# Patient Record
Sex: MALE | Race: OTHER | URBAN - METROPOLITAN AREA
[De-identification: names, ages, dates, MRNs, and addresses within clinical notes are randomized per-mention and may not be internally consistent; named-entity substitution may affect disease eponyms.]

---

## 2022-02-21 ENCOUNTER — HOSPITAL ENCOUNTER (OUTPATIENT)
Dept: LAB | Age: 57
Discharge: HOME | End: 2022-02-21

## 2022-02-21 ENCOUNTER — OFFICE VISIT (OUTPATIENT)
Dept: FAMILY MEDICINE CLINIC | Age: 57
End: 2022-02-21

## 2022-02-21 VITALS
OXYGEN SATURATION: 99 % | HEIGHT: 68 IN | DIASTOLIC BLOOD PRESSURE: 83 MMHG | RESPIRATION RATE: 17 BRPM | HEART RATE: 87 BPM | SYSTOLIC BLOOD PRESSURE: 151 MMHG | BODY MASS INDEX: 28.64 KG/M2 | WEIGHT: 189 LBS | TEMPERATURE: 98.5 F

## 2022-02-21 DIAGNOSIS — H54.7 DECREASED VISUAL ACUITY: ICD-10-CM

## 2022-02-21 DIAGNOSIS — R03.0 ELEVATED BP WITHOUT DIAGNOSIS OF HYPERTENSION: ICD-10-CM

## 2022-02-21 DIAGNOSIS — Z76.89 ENCOUNTER TO ESTABLISH CARE: ICD-10-CM

## 2022-02-21 DIAGNOSIS — Z12.11 SCREENING FOR COLORECTAL CANCER: ICD-10-CM

## 2022-02-21 DIAGNOSIS — Z12.12 SCREENING FOR COLORECTAL CANCER: ICD-10-CM

## 2022-02-21 DIAGNOSIS — H57.11 DISCOMFORT OF RIGHT EYE: Primary | ICD-10-CM

## 2022-02-21 PROCEDURE — 99202 OFFICE O/P NEW SF 15 MIN: CPT | Performed by: FAMILY MEDICINE

## 2022-02-21 NOTE — PROGRESS NOTES
An After Visit Summary was printed and given to the patient. It was explain to the patient how collect the stool specimen. Supplies were given to the patient to collect to sample. Patient verbalized understanding.

## 2022-02-21 NOTE — PROGRESS NOTES
Benny Apodaca is a 55 y.o. male  Chief Complaint   Patient presents with   [facility] Establish Care    Cataract     vision problems     Blood pressure (!) 151/83, pulse 87, temperature 98.5 °F (36.9 °C), temperature source Temporal, resp. rate 17, height 5' 8.11\" (1.73 m), weight 189 lb (85.7 kg), SpO2 99 %. 1. Have you been to the ER, urgent care clinic since your last visit? Hospitalized since your last visit? No    2. Have you seen or consulted any other health care providers outside of the [address] [place] [address] since your last visit? Include any pap smears or colon screening.  No

## 2022-02-21 NOTE — PROGRESS NOTES
Suzanne Mann is a 55 y.o. male   Chief Complaint   Patient presents with   [facility] Establish Care    Cataract     vision problems         ASSESSMENT AND PLAN:    1. Discomfort of right eye  2. Decreased visual acuity  - REFERRAL TO OPHTHALMOLOGY    3. Screening for colorectal cancer  - OCCULT BLOOD IMMUNOASSAY,DIAGNOSTIC; Future    4. Encounter to establish care  - LIPID PANEL; Future  - HEMOGLOBIN A1C WITH EAG; Future  - METABOLIC PANEL, COMPREHENSIVE; Future    5. Elevated BP without diagnosis of hypertension  RTC in about a month for repeat BP. Treat if elevated. SUBJECTIVE:    HPI:  Lee ByrnePuma Eagle is a 55 y.o. male who presents after noticing that his vision has been steadily worsening since Mid-December. He has a history of cataract, last evaluated in [facility] about 5 years ago. He wears glasses. He has a yellow spot clouding his vision and difficulty focusing his eyes. He also feels a constant dull pain/pressure sensation in the mid-upper portion of his right globe. No increased pain with EOMI. No eye discharge. No headaches or dizziness. He has been in [place] for one year. PMH: NOne  PSH: Lithotripsy for nephrolithiasis 30years ago  MEDS: None  ALL: NKDA  SH: Denies nicotine, tobacco and alcohol consumption  FH: Non-contributory      Review of Systems   Constitutional: Negative for fever and malaise/fatigue. Eyes: Positive for blurred vision, double vision, photophobia and pain. Negative for discharge and redness. Respiratory: Negative for cough and shortness of breath. Cardiovascular: Negative for chest pain, palpitations and leg swelling. Gastrointestinal: Negative for abdominal pain, constipation, diarrhea, nausea and vomiting. Neurological: Negative for dizziness and headaches.          BP (!) 151/83 (BP 1 Location: Right upper arm, BP Patient Position: Sitting, BP Cuff Size: Adult long)   Pulse 87   Temp 98.5 °F (36.9 °C) (Temporal)   Resp 17   Ht 5' 8.11\" (1.73 m)   Wt 189 lb (85.7 kg)   SpO2 99%   BMI 28.64 kg/m²     Physical Exam  Constitutional:       General: He is not in acute distress. Appearance: Normal appearance. HENT:      Head: Normocephalic and atraumatic. Right Ear: Tympanic membrane, ear canal and external ear normal.      Left Ear: Tympanic membrane, ear canal and external ear normal.   Eyes:      Extraocular Movements: Extraocular movements intact. Conjunctiva/sclera: Conjunctivae normal.      Pupils: Pupils are equal, round, and reactive to light. Cardiovascular:      Rate and Rhythm: Normal rate and regular rhythm. Pulses: Normal pulses. Heart sounds: Normal heart sounds. Pulmonary:      Effort: Pulmonary effort is normal. No respiratory distress. Breath sounds: Normal breath sounds. Musculoskeletal:      Cervical back: No tenderness. Left lower leg: Edema present. Lymphadenopathy:      Cervical: No cervical adenopathy. Neurological:      Mental Status: He is alert.

## 2022-02-22 ENCOUNTER — LAB ONLY (OUTPATIENT)
Dept: FAMILY MEDICINE CLINIC | Age: 57
End: 2022-02-22

## 2022-02-22 ENCOUNTER — HOSPITAL ENCOUNTER (OUTPATIENT)
Dept: LAB | Age: 57
Discharge: HOME | End: 2022-02-22

## 2022-02-22 DIAGNOSIS — Z12.11 SCREENING FOR COLORECTAL CANCER: ICD-10-CM

## 2022-02-22 DIAGNOSIS — Z12.12 SCREENING FOR COLORECTAL CANCER: ICD-10-CM

## 2022-02-22 LAB
ALBUMIN SERPL-MCNC: 4.2 G/DL (ref 3.5–5)
ALBUMIN/GLOB SERPL: 1.3 {RATIO} (ref 1.1–2.2)
ALP SERPL-CCNC: 78 U/L (ref 45–117)
ALT SERPL-CCNC: 31 U/L (ref 12–78)
ANION GAP SERPL CALC-SCNC: 3 MMOL/L (ref 5–15)
AST SERPL-CCNC: 18 U/L (ref 15–37)
BILIRUB SERPL-MCNC: 0.3 MG/DL (ref 0.2–1)
BUN SERPL-MCNC: 14 MG/DL (ref 6–20)
BUN/CREAT SERPL: 16 (ref 12–20)
CALCIUM SERPL-MCNC: 9.3 MG/DL (ref 8.5–10.1)
CHLORIDE SERPL-SCNC: 106 MMOL/L (ref 97–108)
CHOLEST SERPL-MCNC: 195 MG/DL
CO2 SERPL-SCNC: 29 MMOL/L (ref 21–32)
CREAT SERPL-MCNC: 0.87 MG/DL (ref 0.7–1.3)
EST. AVERAGE GLUCOSE BLD GHB EST-MCNC: 105 MG/DL
GLOBULIN SER CALC-MCNC: 3.3 G/DL (ref 2–4)
GLUCOSE SERPL-MCNC: 108 MG/DL (ref 65–100)
HBA1C MFR BLD: 5.3 % (ref 4–5.6)
HDLC SERPL-MCNC: 31 MG/DL
HDLC SERPL: 6.3 {RATIO} (ref 0–5)
LDLC SERPL CALC-MCNC: 117.2 MG/DL (ref 0–100)
POTASSIUM SERPL-SCNC: 4.7 MMOL/L (ref 3.5–5.1)
PROT SERPL-MCNC: 7.5 G/DL (ref 6.4–8.2)
SODIUM SERPL-SCNC: 138 MMOL/L (ref 136–145)
TRIGL SERPL-MCNC: 234 MG/DL (ref ?–150)
VLDLC SERPL CALC-MCNC: 46.8 MG/DL

## 2022-02-22 PROCEDURE — 80053 COMPREHEN METABOLIC PANEL: CPT

## 2022-02-22 PROCEDURE — 82274 ASSAY TEST FOR BLOOD FECAL: CPT

## 2022-02-22 PROCEDURE — 83036 HEMOGLOBIN GLYCOSYLATED A1C: CPT

## 2022-02-22 PROCEDURE — 80061 LIPID PANEL: CPT

## 2022-02-23 LAB — HEMOCCULT STL QL IA: NEGATIVE

## 2022-02-23 NOTE — PROGRESS NOTES
Please let him know. .. He is not diabetic. His liver and kidney function are good. His cholesterol is a little high -- no medication needed, but he should try to make healthy diet choices and avoid heavy, fried or sugary foods. Thanks.

## 2022-02-24 NOTE — PROGRESS NOTES
Tc to the pt with the [facility] Int # [id number]. The lab results message was given to the pt. After he verified his name and . He verbalized understanding.  Rosa Pickett RN

## 2022-02-24 NOTE — PROGRESS NOTES
Tc to the pt with the assistance of [facility] Int # [id number]. Tc 2x. No answer. LM a lab letter was created and sent to the queue.  Diane Pack RN

## 2022-02-26 ENCOUNTER — TELEPHONE (OUTPATIENT)
Dept: FAMILY MEDICINE CLINIC | Age: 57
End: 2022-02-26

## 2022-03-03 ENCOUNTER — OFFICE VISIT (OUTPATIENT)
Dept: FAMILY MEDICINE CLINIC | Age: 57
End: 2022-03-03

## 2022-03-03 DIAGNOSIS — Z71.89 COUNSELING AND COORDINATION OF CARE: Primary | ICD-10-CM

## 2022-03-03 PROCEDURE — 99080 SPECIAL REPORTS OR FORMS: CPT | Performed by: FAMILY MEDICINE

## 2022-03-03 NOTE — PROGRESS NOTES
AN financial screening has been completed. Patient has been instructed to call appointment line on or after 3/17/22. Patient has been screened for [facility]. Patient has marked Health insurance (score 3) as a primary need.  Patient does not have a SS#.

## 2022-04-01 ENCOUNTER — OFFICE VISIT (OUTPATIENT)
Dept: FAMILY MEDICINE CLINIC | Age: 57
End: 2022-04-01

## 2022-04-01 VITALS
DIASTOLIC BLOOD PRESSURE: 81 MMHG | SYSTOLIC BLOOD PRESSURE: 136 MMHG | WEIGHT: 193 LBS | TEMPERATURE: 97.9 F | HEART RATE: 78 BPM | RESPIRATION RATE: 16 BRPM | HEIGHT: 68 IN | OXYGEN SATURATION: 98 % | BODY MASS INDEX: 29.25 KG/M2

## 2022-04-01 DIAGNOSIS — R03.0 ELEVATED BP WITHOUT DIAGNOSIS OF HYPERTENSION: Primary | ICD-10-CM

## 2022-04-01 PROCEDURE — 99212 OFFICE O/P EST SF 10 MIN: CPT | Performed by: FAMILY MEDICINE

## 2022-04-01 NOTE — PROGRESS NOTES
I have printed AVS and reviewed it with patient today. I reviewed with the patient the provider instructions to continue walking and eating healthy. Patient verbalized understanding. I explained to the patient to call the clinic for any concerns and follow up in one year for his annual wellness check. Patient verbalized understanding. The patient correctly confirmed his complete name and date of birth prior to the information shared. Maite Raya with the [place] [id number] assisted with this visit.  Juan Antonio Peterson RN

## 2022-04-01 NOTE — PROGRESS NOTES
Naeem Mahan is a 55 y.o. male  Chief Complaint   Patient presents with    Hypertension     Blood pressure 136/81, pulse 78, temperature 97.9 °F (36.6 °C), temperature source Temporal, resp. rate 16, height 5' 8.11\" (1.73 m), weight 193 lb (87.5 kg), SpO2 98 %. 1. Have you been to the ER, urgent care clinic since your last visit? Hospitalized since your last visit? No    2. Have you seen or consulted any other health care providers outside of the [address] [place] [address] since your last visit? Include any pap smears or colon screening.  No

## 2022-04-01 NOTE — PROGRESS NOTES
Tonia Christensen is a 55 y.o. male   Chief Complaint   Patient presents with    Hypertension         ASSESSMENT AND PLAN:    1. Elevated BP without diagnosis of hypertension  BP has decreased. Patient is active and committed to healthy eating and physical activity. Continue lifestyle management. Follow up as needed. Labs reviewed. SUBJECTIVE:    HPI:  Mel Gayle. Purvi Rahman is a 55 y.o. male who presents for follow up on BP. At his last visit it was 151/83. He has been doing well in the meantime and denies any symptoms. He has completed the AN process for Ophthalmology (for cataracts) and is expecting a phone call for an appointment. He has been watching his diet and he walks for 30 minutes every day. Review of Systems   Constitutional: Negative for fever and malaise/fatigue. Eyes: Positive for blurred vision. Respiratory: Negative for cough and shortness of breath. Cardiovascular: Negative for chest pain, palpitations and leg swelling. Gastrointestinal: Negative for abdominal pain, constipation, diarrhea, nausea and vomiting. Neurological: Negative for dizziness and headaches. /81 (BP 1 Location: Left upper arm, BP Patient Position: Sitting, BP Cuff Size: Adult)   Pulse 78   Temp 97.9 °F (36.6 °C) (Temporal)   Resp 16   Ht 5' 8.11\" (1.73 m)   Wt 193 lb (87.5 kg)   SpO2 98%   BMI 29.25 kg/m²     Physical Exam  Constitutional:       General: He is not in acute distress. Appearance: Normal appearance. Eyes:      Extraocular Movements: Extraocular movements intact. Conjunctiva/sclera: Conjunctivae normal.      Pupils: Pupils are equal, round, and reactive to light. Cardiovascular:      Rate and Rhythm: Normal rate and regular rhythm. Pulses: Normal pulses. Heart sounds: Normal heart sounds. Pulmonary:      Effort: Pulmonary effort is normal.      Breath sounds: Normal breath sounds. Neurological:      Mental Status: He is alert.

## 2022-05-20 ENCOUNTER — OFFICE VISIT (OUTPATIENT)
Dept: FAMILY MEDICINE CLINIC | Age: 57
End: 2022-05-20

## 2022-05-20 VITALS
TEMPERATURE: 97.9 F | SYSTOLIC BLOOD PRESSURE: 133 MMHG | HEIGHT: 68 IN | DIASTOLIC BLOOD PRESSURE: 79 MMHG | WEIGHT: 188 LBS | BODY MASS INDEX: 28.49 KG/M2 | OXYGEN SATURATION: 99 % | HEART RATE: 69 BPM

## 2022-05-20 DIAGNOSIS — Z01.818 PREOP EXAMINATION: Primary | ICD-10-CM

## 2022-05-20 PROCEDURE — 99213 OFFICE O/P EST LOW 20 MIN: CPT | Performed by: FAMILY MEDICINE

## 2022-05-20 NOTE — PROGRESS NOTES
Coordination of Care  1. Have you been to the ER, urgent care clinic since your last visit? Hospitalized since your last visit? No    2. Have you seen or consulted any other health care providers outside of the [address] [place] [address] since your last visit? Include any pap smears or colon screening. No    Does the patient need refills? NO    Learning Assessment Complete?  yes  Depression Screening complete in the past 12 months? yes

## 2022-05-20 NOTE — PROGRESS NOTES
I have printed AVS and reviewed it with patient today. The patient was given the pre-op information for his surgeon. Patient verbalized understanding. Patient correctly stated his full name and date of birth prior to the information shared.  [id number] with the [facility] service assisted with this discharge.  Darby Boyer RN

## 2022-05-20 NOTE — PROGRESS NOTES
Shahzad Fairbanks is a 55 y.o. male   Chief Complaint   Patient presents with    Pre-op Exam         ASSESSMENT AND PLAN:    1. Preop examination  Cleared for cataract surgery  Paperwork completed and returned to patient    SUBJECTIVE:    HPI:  Kiki BradyPuma Luther is a 55 y.o. male who presents for preop clearance for cataract surgery 5/26. He has implemented lifestyle measures successfully to lower his BP. He now walks 45-60 minutes everyday. He has been watching his diet. He is not taking any medications. No drug allergies. He has had no prior surgeries or anesthesia. Review of Systems   Constitutional: Negative for fever and malaise/fatigue. Eyes: Negative for blurred vision. Respiratory: Negative for cough and shortness of breath. Cardiovascular: Negative for chest pain, palpitations and leg swelling. Gastrointestinal: Negative for abdominal pain, constipation, diarrhea, nausea and vomiting. Neurological: Negative for dizziness and headaches. /79 (BP 1 Location: Left upper arm, BP Patient Position: Sitting)   Pulse 69   Temp 97.9 °F (36.6 °C) (Temporal)   Ht 5' 8.11\" (1.73 m)   Wt 188 lb (85.3 kg)   SpO2 99%   BMI 28.49 kg/m²     Physical Exam  Constitutional:       General: He is not in acute distress. Appearance: Normal appearance. HENT:      Head: Normocephalic and atraumatic. Mouth/Throat:      Mouth: Mucous membranes are moist.      Pharynx: Oropharynx is clear. No oropharyngeal exudate or posterior oropharyngeal erythema. Eyes:      Extraocular Movements: Extraocular movements intact. Conjunctiva/sclera: Conjunctivae normal.      Pupils: Pupils are equal, round, and reactive to light. Cardiovascular:      Rate and Rhythm: Normal rate and regular rhythm. Pulses: Normal pulses. Heart sounds: Normal heart sounds. Pulmonary:      Effort: Pulmonary effort is normal. No respiratory distress.       Breath sounds: Normal breath sounds. Abdominal:      General: Bowel sounds are normal. There is no distension. Palpations: Abdomen is soft. Tenderness: There is no abdominal tenderness. Musculoskeletal:         General: Normal range of motion. Cervical back: No tenderness. Right lower leg: No edema. Left lower leg: No edema. Lymphadenopathy:      Cervical: No cervical adenopathy. Skin:     General: Skin is warm. Neurological:      Mental Status: He is alert and oriented to person, place, and time.       Gait: Gait normal.      Deep Tendon Reflexes: Reflexes normal.   Psychiatric:         Behavior: Behavior normal.